# Patient Record
Sex: FEMALE | Race: WHITE | NOT HISPANIC OR LATINO | URBAN - METROPOLITAN AREA
[De-identification: names, ages, dates, MRNs, and addresses within clinical notes are randomized per-mention and may not be internally consistent; named-entity substitution may affect disease eponyms.]

---

## 2018-04-11 ENCOUNTER — OFFICE (OUTPATIENT)
Dept: URBAN - METROPOLITAN AREA CLINIC 78 | Facility: CLINIC | Age: 43
End: 2018-04-11

## 2018-04-11 VITALS
DIASTOLIC BLOOD PRESSURE: 92 MMHG | SYSTOLIC BLOOD PRESSURE: 118 MMHG | HEART RATE: 62 BPM | WEIGHT: 274 LBS | TEMPERATURE: 98.3 F | HEIGHT: 69 IN

## 2018-04-11 DIAGNOSIS — K76.0 FATTY (CHANGE OF) LIVER, NOT ELSEWHERE CLASSIFIED: ICD-10-CM

## 2018-04-11 PROCEDURE — 99244 OFF/OP CNSLTJ NEW/EST MOD 40: CPT

## 2018-04-11 NOTE — SERVICEHPINOTES
ROSALES CORTES   is a   42   year old    female who is being seen in consultation at the request of   AMAIRANI RIVAS   for fatty liver. Patient notes h/o being overweight and some h/o yo-yo dieting in past. She was having a lot of fatigue, brain fog and some RUQ discomfort last fall and during w/u she was noted to have fatty liver. Liver enzymes were normal. She has also seen neurology for some neuropathy issues. She has been reading about fatty liver and is making significant dietary/lifestyle changes along with liver supportive supplements. Has been able to lose weight and wants to continue gradual sustained weight loss with goal of getting her BMI &lt30. She is wanting to be as proactive as possible and is here for further advice. No GI complaints. Feeling well overall and prior RUQ discomfort and brain fog have resolved.  12/21/17 HgbA1C 5.0BR11/9/17 AST/ALT 18/18, ESR 34, B12 1093, Vit D 39, SPEP ok, TRINI neg, CRP 3.6BR11/4/17 plt 296, glucose 103, AST/ALT 18/22

## 2018-04-11 NOTE — INTERFACERESULTNOTES
These labs look good overall. Good cholesterol is a bit low. Otherwise normal lab. Insulin level is 13.4 - some would say that optimal is <8 but not a significant issue right now - likely improved based on your recent diet/lifestyle changes.

## 2018-04-18 LAB
CBC (INCLUDES DIFF/PLT): ABSOLUTE BAND NEUTROPHILS: NORMAL CELLS/UL
CBC (INCLUDES DIFF/PLT): ABSOLUTE BASOPHILS: 33 CELLS/UL (ref 0–200)
CBC (INCLUDES DIFF/PLT): ABSOLUTE BLASTS: NORMAL CELLS/UL
CBC (INCLUDES DIFF/PLT): ABSOLUTE EOSINOPHILS: 94 CELLS/UL (ref 15–500)
CBC (INCLUDES DIFF/PLT): ABSOLUTE LYMPHOCYTES: 2475 CELLS/UL (ref 850–3900)
CBC (INCLUDES DIFF/PLT): ABSOLUTE METAMYELOCYTES: NORMAL CELLS/UL
CBC (INCLUDES DIFF/PLT): ABSOLUTE MONOCYTES: 556 CELLS/UL (ref 200–950)
CBC (INCLUDES DIFF/PLT): ABSOLUTE MYELOCYTES: NORMAL CELLS/UL
CBC (INCLUDES DIFF/PLT): ABSOLUTE NEUTROPHILS: 2343 CELLS/UL (ref 1500–7800)
CBC (INCLUDES DIFF/PLT): ABSOLUTE NUCLEATED RBC: NORMAL CELLS/UL
CBC (INCLUDES DIFF/PLT): ABSOLUTE PROMYELOCYTES: NORMAL CELLS/UL
CBC (INCLUDES DIFF/PLT): BAND NEUTROPHILS: NORMAL %
CBC (INCLUDES DIFF/PLT): BASOPHILS: 0.6 %
CBC (INCLUDES DIFF/PLT): BLASTS: NORMAL %
CBC (INCLUDES DIFF/PLT): COMMENT(S): NORMAL
CBC (INCLUDES DIFF/PLT): EOSINOPHILS: 1.7 %
CBC (INCLUDES DIFF/PLT): HEMATOCRIT: 40.6 % (ref 35–45)
CBC (INCLUDES DIFF/PLT): HEMOGLOBIN: 13.8 G/DL (ref 11.7–15.5)
CBC (INCLUDES DIFF/PLT): LYMPHOCYTES: 45 %
CBC (INCLUDES DIFF/PLT): MCH: 31.2 PG (ref 27–33)
CBC (INCLUDES DIFF/PLT): MCHC: 34 G/DL (ref 32–36)
CBC (INCLUDES DIFF/PLT): MCV: 91.6 FL (ref 80–100)
CBC (INCLUDES DIFF/PLT): METAMYELOCYTES: NORMAL %
CBC (INCLUDES DIFF/PLT): MONOCYTES: 10.1 %
CBC (INCLUDES DIFF/PLT): MPV: 10.5 FL (ref 7.5–12.5)
CBC (INCLUDES DIFF/PLT): MYELOCYTES: NORMAL %
CBC (INCLUDES DIFF/PLT): NEUTROPHILS: 42.6 %
CBC (INCLUDES DIFF/PLT): NUCLEATED RBC: NORMAL /100 WBC
CBC (INCLUDES DIFF/PLT): PLATELET COUNT: 288 THOUSAND/UL (ref 140–400)
CBC (INCLUDES DIFF/PLT): PROMYELOCYTES: NORMAL %
CBC (INCLUDES DIFF/PLT): RDW: 14.2 % (ref 11–15)
CBC (INCLUDES DIFF/PLT): REACTIVE LYMPHOCYTES: NORMAL %
CBC (INCLUDES DIFF/PLT): RED BLOOD CELL COUNT: 4.43 MILLION/UL (ref 3.8–5.1)
CBC (INCLUDES DIFF/PLT): WHITE BLOOD CELL COUNT: 5.5 THOUSAND/UL (ref 3.8–10.8)
COMPREHENSIVE METABOLIC PANEL: ALBUMIN/GLOBULIN RATIO: 1.6 (CALC) (ref 1–2.5)
COMPREHENSIVE METABOLIC PANEL: ALBUMIN: 4 G/DL (ref 3.6–5.1)
COMPREHENSIVE METABOLIC PANEL: ALKALINE PHOSPHATASE: 79 U/L (ref 33–115)
COMPREHENSIVE METABOLIC PANEL: ALT: 25 U/L (ref 6–29)
COMPREHENSIVE METABOLIC PANEL: AST: 18 U/L (ref 10–30)
COMPREHENSIVE METABOLIC PANEL: BILIRUBIN, TOTAL: 0.4 MG/DL (ref 0.2–1.2)
COMPREHENSIVE METABOLIC PANEL: BUN/CREATININE RATIO: (no result) (CALC)
COMPREHENSIVE METABOLIC PANEL: CALCIUM: 9 MG/DL (ref 8.6–10.2)
COMPREHENSIVE METABOLIC PANEL: CARBON DIOXIDE: 24 MMOL/L (ref 20–31)
COMPREHENSIVE METABOLIC PANEL: CHLORIDE: 108 MMOL/L (ref 98–110)
COMPREHENSIVE METABOLIC PANEL: CREATININE: 0.69 MG/DL (ref 0.5–1.1)
COMPREHENSIVE METABOLIC PANEL: EGFR AFRICAN AMERICAN: 124 ML/MIN/1.73M2 (ref 60–?)
COMPREHENSIVE METABOLIC PANEL: EGFR NON-AFR. AMERICAN: 107 ML/MIN/1.73M2 (ref 60–?)
COMPREHENSIVE METABOLIC PANEL: GLOBULIN: 2.5 G/DL (CALC) (ref 1.9–3.7)
COMPREHENSIVE METABOLIC PANEL: GLUCOSE: 80 MG/DL (ref 65–99)
COMPREHENSIVE METABOLIC PANEL: POTASSIUM: 4.2 MMOL/L (ref 3.5–5.3)
COMPREHENSIVE METABOLIC PANEL: PROTEIN, TOTAL: 6.5 G/DL (ref 6.1–8.1)
COMPREHENSIVE METABOLIC PANEL: SODIUM: 138 MMOL/L (ref 135–146)
COMPREHENSIVE METABOLIC PANEL: UREA NITROGEN (BUN): 7 MG/DL (ref 7–25)
INSULIN: 13.4 UIU/ML (ref 2–19.6)
LIPID PANEL: CHOL/HDLC RATIO: 3.4 (CALC) (ref ?–5)
LIPID PANEL: CHOLESTEROL, TOTAL: 142 MG/DL (ref ?–200)
LIPID PANEL: HDL CHOLESTEROL: 42 MG/DL — LOW (ref 50–?)
LIPID PANEL: LDL-CHOLESTEROL: 81 MG/DL (CALC)
LIPID PANEL: NON HDL CHOLESTEROL: 100 MG/DL (CALC) (ref ?–130)
LIPID PANEL: TRIGLYCERIDES: 99 MG/DL (ref ?–150)

## 2019-04-11 ENCOUNTER — OFFICE (OUTPATIENT)
Dept: URBAN - METROPOLITAN AREA CLINIC 78 | Facility: CLINIC | Age: 44
End: 2019-04-11

## 2019-04-11 VITALS
HEART RATE: 77 BPM | TEMPERATURE: 97.4 F | HEIGHT: 69 IN | WEIGHT: 293 LBS | SYSTOLIC BLOOD PRESSURE: 132 MMHG | DIASTOLIC BLOOD PRESSURE: 96 MMHG

## 2019-04-11 DIAGNOSIS — K76.0 FATTY (CHANGE OF) LIVER, NOT ELSEWHERE CLASSIFIED: ICD-10-CM

## 2019-04-11 DIAGNOSIS — R10.11 RIGHT UPPER QUADRANT PAIN: ICD-10-CM

## 2019-04-11 PROCEDURE — 99214 OFFICE O/P EST MOD 30 MIN: CPT

## 2019-04-11 NOTE — SERVICEHPINOTES
42 yo female presents for f/u fatty liver. Last seen a year ago at which time she had been doing well with diet/lifestyle with result of weight loss and resolution of her prior RUQ pain and brain fog. Currently she has not been doing as well with her diet and has gained weight and has had return of RUQ pain and brain fog symptoms. States it is a dull pain that is there pretty much constantly. If she leans to the right she feels worse - sharper pain. Sometimes when she eats more acidic or higher fat foods or if she drinks coffee she can have worse symptoms however, often when she consumes these foods she feels ok, so she doesn't really feel there's a connection. She notes that high stress levels can also exacerbate the issue. She uses Naproxen about once a week for arthritis. Denies N/V. Has some baseline mild irregularity of bowel habits no blood in stools. Was on Prozac and trazodone in past but has been off these and is having more anxiety symptoms - planning to go back on meds and seeing a psychiatrist. Has also been working with a naturopath and taking supplements. Has known fatty liver but no evidence of fibrosis per Fibroscan last year and also has normal liver enzymes. She is anxious about her symptoms and reports having dreams of removing a metal pipe from her abdomen. 2/4/19 AST/ALT 18/25BR9/27/18 , TG 61, HDL 56, glucose 80, AST/ALT 18/27, HgbA1C 5.0, TSH 1.66, fT4 1.0, fT3 2.8, Vit B12 859, insulin 22.7, Vit D 32BR4/16/18 , TG 99, HDL 42, LDL 81, glucose 80, AST/ALT 18/25, plt 288, insulin 13.4 ELISSA score 2.6BR12/21/17 HgbA1C 5.0BR11/9/17 AST/ALT 18/18, ESR 34, B12 1093, Vit D 39, SPEP ok, TRINI neg, CRP 3.6BR11/4/17 plt 296, glucose 103, AST/ALT 18/22

## 2019-04-29 ENCOUNTER — AMBULATORY SURGICAL CENTER (OUTPATIENT)
Dept: URBAN - METROPOLITAN AREA SURGERY 21 | Facility: SURGERY | Age: 44
End: 2019-04-29

## 2019-04-29 DIAGNOSIS — R10.13 EPIGASTRIC PAIN: ICD-10-CM

## 2019-04-29 DIAGNOSIS — K29.70 GASTRITIS, UNSPECIFIED, WITHOUT BLEEDING: ICD-10-CM

## 2019-04-29 PROCEDURE — 43239 EGD BIOPSY SINGLE/MULTIPLE: CPT

## 2020-02-23 PROBLEM — H43.813 POSTERIOR VITREOUS DETACHMENT (PVD): Noted: 2020-02-23

## 2020-02-23 PROBLEM — H44.2E3 DEGENERATIVE MYOPIA WITH OTHER MACULOPATHY: Noted: 2020-02-23

## 2020-02-23 PROBLEM — H43.813 PARTIAL POSTERIOR VITREOUS DETACHMENT: Noted: 2020-02-23

## 2020-02-24 ENCOUNTER — PREPPED CHART (OUTPATIENT)
Dept: URBAN - METROPOLITAN AREA CLINIC 66 | Facility: CLINIC | Age: 45
End: 2020-02-24

## 2021-05-24 ENCOUNTER — OFFICE (OUTPATIENT)
Dept: URBAN - METROPOLITAN AREA CLINIC 79 | Facility: CLINIC | Age: 46
End: 2021-05-24

## 2021-05-24 VITALS
HEIGHT: 69 IN | HEART RATE: 61 BPM | WEIGHT: 218 LBS | TEMPERATURE: 97.7 F | SYSTOLIC BLOOD PRESSURE: 121 MMHG | DIASTOLIC BLOOD PRESSURE: 75 MMHG

## 2021-05-24 DIAGNOSIS — E66.9 OBESITY, UNSPECIFIED: ICD-10-CM

## 2021-05-24 DIAGNOSIS — K76.0 FATTY (CHANGE OF) LIVER, NOT ELSEWHERE CLASSIFIED: ICD-10-CM

## 2021-05-24 PROCEDURE — 99214 OFFICE O/P EST MOD 30 MIN: CPT | Performed by: PHYSICIAN ASSISTANT

## 2021-05-24 NOTE — SERVICEHPINOTES
Ms. Young is here for f/u. 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 She has since lost weight and drinks a lot of water. She has lost 95 lbs with diet and exercise. She wants a check up on her liver. No recent LFTs last LFTs were in 2019 and were normal at that time. Her last fibroscan in 2018 showed no fibrosis but moderate to severe fatty liver.  She doesn't smoke or drink. No longer using herbal supplementationShe declines a colonoscopy (we discussed that this can r/o and prevent CRC and now indicated given age of 45 for screening).

## 2021-05-25 LAB
HEPATIC FUNCTION PANEL: ALBUMIN/GLOBULIN RATIO: 1.8 (CALC) (ref 1–2.5)
HEPATIC FUNCTION PANEL: ALBUMIN: 4.2 G/DL (ref 3.6–5.1)
HEPATIC FUNCTION PANEL: ALKALINE PHOSPHATASE: 54 U/L (ref 31–125)
HEPATIC FUNCTION PANEL: ALT: 8 U/L (ref 6–29)
HEPATIC FUNCTION PANEL: AST: 9 U/L — LOW (ref 10–35)
HEPATIC FUNCTION PANEL: BILIRUBIN, DIRECT: 0.1 MG/DL (ref ?–0.2)
HEPATIC FUNCTION PANEL: BILIRUBIN, INDIRECT: 0.4 MG/DL (CALC) (ref 0.2–1.2)
HEPATIC FUNCTION PANEL: BILIRUBIN, TOTAL: 0.5 MG/DL (ref 0.2–1.2)
HEPATIC FUNCTION PANEL: GLOBULIN: 2.4 G/DL (CALC) (ref 1.9–3.7)
HEPATIC FUNCTION PANEL: PROTEIN, TOTAL: 6.6 G/DL (ref 6.1–8.1)

## 2021-06-09 ENCOUNTER — OFFICE (OUTPATIENT)
Dept: URBAN - METROPOLITAN AREA CLINIC 102 | Facility: CLINIC | Age: 46
End: 2021-06-09

## 2021-06-09 DIAGNOSIS — K76.0 FATTY (CHANGE OF) LIVER, NOT ELSEWHERE CLASSIFIED: ICD-10-CM

## 2021-06-09 PROCEDURE — 91200 LIVER ELASTOGRAPHY: CPT | Performed by: INTERNAL MEDICINE

## 2021-06-16 ENCOUNTER — OFFICE (OUTPATIENT)
Dept: URBAN - METROPOLITAN AREA TELEHEALTH 3 | Facility: TELEHEALTH | Age: 46
End: 2021-06-16

## 2021-06-16 VITALS — HEIGHT: 69 IN | WEIGHT: 222 LBS

## 2021-06-16 DIAGNOSIS — K76.0 FATTY (CHANGE OF) LIVER, NOT ELSEWHERE CLASSIFIED: ICD-10-CM

## 2021-06-16 DIAGNOSIS — E66.9 OBESITY, UNSPECIFIED: ICD-10-CM

## 2021-06-16 PROCEDURE — 99214 OFFICE O/P EST MOD 30 MIN: CPT | Mod: 95 | Performed by: PHYSICIAN ASSISTANT

## 2021-06-16 NOTE — SERVICEHPINOTES
PATIENT VERIFIED BY DATE OF BIRTH AND NAME. Patient has been consented for this telecommunication visit.   Ms. Young is here for f/u regarding fatty liver.    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 She has since lost weight and drinks a lot of water. She has lost 95 lbs with diet and exercise. Fibroscan in 2018 showed no fibrosis but moderate to severe fatty liver. Updated fibroscan shows S2 and F1 and LFTs are normal. She doesn't smoke or drink. No longer using herbal supplementationShe takes gabapentin which has stalled her weight loss. The plan is to get off of the gabapentin once she heals from neuropathy. She exercises daily. She declines a colonoscopy (we discussed that this can r/o and prevent CRC and now indicated given age of 45 for screening). ROS as per HPI and o/w is negative

## 2022-09-08 ENCOUNTER — OFFICE (OUTPATIENT)
Dept: URBAN - METROPOLITAN AREA CLINIC 102 | Facility: CLINIC | Age: 47
End: 2022-09-08

## 2022-09-08 DIAGNOSIS — K76.0 FATTY (CHANGE OF) LIVER, NOT ELSEWHERE CLASSIFIED: ICD-10-CM

## 2022-09-08 PROCEDURE — 91200 LIVER ELASTOGRAPHY: CPT | Performed by: INTERNAL MEDICINE

## 2023-01-10 ENCOUNTER — TELEHEALTH PROVIDED OTHER THAN IN PATIENT'S HOME (OUTPATIENT)
Dept: URBAN - METROPOLITAN AREA TELEHEALTH 12 | Facility: TELEHEALTH | Age: 48
End: 2023-01-10

## 2023-01-10 VITALS — WEIGHT: 260 LBS | HEIGHT: 69 IN

## 2023-01-10 DIAGNOSIS — R10.11 RIGHT UPPER QUADRANT PAIN: ICD-10-CM

## 2023-01-10 DIAGNOSIS — E66.9 OBESITY, UNSPECIFIED: ICD-10-CM

## 2023-01-10 DIAGNOSIS — R79.0 ABNORMAL LEVEL OF BLOOD MINERAL: ICD-10-CM

## 2023-01-10 PROCEDURE — 99214 OFFICE O/P EST MOD 30 MIN: CPT | Mod: 95 | Performed by: PHYSICIAN ASSISTANT

## 2023-01-10 NOTE — SERVICEHPINOTES
PATIENT VERIFIED BY DATE OF BIRTH AND NAME. Patient has been consented for this telecommunication visit.   We had previously discussed a colonoscopy and she had declined it in the past.  In the past 3 months, she notes pain in her right side--she tells me that she had an unremarkable CT scan (aside from ddd in her spine). But upon review, she's had RUQ pain intermittently for yrs. Pain is located in the RUQ. She felt worse after having her flu shot. She has given up chocolate, coffee. Pain can be brought on by eating gluten, dairy, etc. She would get pain earlier on with any po intake. She has been taking IBgard. No vomiting and very mild nausea at times. Pain can wake her up from sleep. U/S have shown normal gallbladder as has a CT scan. EGD for RUQ pain showed mild gastritis. She tells me that she has a chronically low ferritin and is on iron. When on iron, it seems to exacerbate this R sided pain. She had a negative cologuard recently. She drinks a lot of non-dairy keiffer and she thinks that this has helped her symptoms too. No family hx of gallbladder disease. No blood in her stool or per rectum. She had a + fecal occult test in August 2022. No family hx of gallbladder disease. Both of her grandfathers had CRC. No known heart issues--has had a cardiac eval in the past--normal echo and stress test. 
br
br
ROS as per HPI and o/w is unremarkable.

## 2023-02-14 ENCOUNTER — OFFICE (OUTPATIENT)
Dept: URBAN - METROPOLITAN AREA CLINIC 79 | Facility: CLINIC | Age: 48
End: 2023-02-14

## 2023-02-14 PROCEDURE — 00031: CPT | Performed by: INTERNAL MEDICINE

## 2023-05-19 ENCOUNTER — OFFICE (OUTPATIENT)
Dept: URBAN - METROPOLITAN AREA CLINIC 30 | Facility: CLINIC | Age: 48
End: 2023-05-19
Payer: COMMERCIAL

## 2023-05-19 ENCOUNTER — OFFICE (OUTPATIENT)
Dept: URBAN - METROPOLITAN AREA PATHOLOGY 18 | Facility: PATHOLOGY | Age: 48
End: 2023-05-19
Payer: COMMERCIAL

## 2023-05-19 VITALS
OXYGEN SATURATION: 95 % | DIASTOLIC BLOOD PRESSURE: 79 MMHG | SYSTOLIC BLOOD PRESSURE: 117 MMHG | DIASTOLIC BLOOD PRESSURE: 111 MMHG | OXYGEN SATURATION: 94 % | OXYGEN SATURATION: 96 % | DIASTOLIC BLOOD PRESSURE: 105 MMHG | SYSTOLIC BLOOD PRESSURE: 127 MMHG | RESPIRATION RATE: 20 BRPM | OXYGEN SATURATION: 99 % | DIASTOLIC BLOOD PRESSURE: 92 MMHG | SYSTOLIC BLOOD PRESSURE: 144 MMHG | TEMPERATURE: 98 F | SYSTOLIC BLOOD PRESSURE: 161 MMHG | WEIGHT: 270 LBS | HEART RATE: 71 BPM | RESPIRATION RATE: 24 BRPM | SYSTOLIC BLOOD PRESSURE: 132 MMHG | RESPIRATION RATE: 16 BRPM | HEIGHT: 69 IN | RESPIRATION RATE: 18 BRPM | TEMPERATURE: 97.7 F | HEART RATE: 64 BPM | HEART RATE: 70 BPM | RESPIRATION RATE: 22 BRPM | RESPIRATION RATE: 12 BRPM | DIASTOLIC BLOOD PRESSURE: 80 MMHG | HEART RATE: 67 BPM

## 2023-05-19 DIAGNOSIS — K62.1 RECTAL POLYP: ICD-10-CM

## 2023-05-19 DIAGNOSIS — R10.11 RIGHT UPPER QUADRANT PAIN: ICD-10-CM

## 2023-05-19 PROBLEM — K63.5 POLYP OF COLON: Status: ACTIVE | Noted: 2023-05-19

## 2023-05-19 PROCEDURE — 88305 TISSUE EXAM BY PATHOLOGIST: CPT | Performed by: PATHOLOGY

## 2023-06-28 ENCOUNTER — TELEHEALTH PROVIDED IN PATIENT'S HOME (OUTPATIENT)
Dept: URBAN - METROPOLITAN AREA CLINIC 102 | Facility: CLINIC | Age: 48
End: 2023-06-28
Payer: COMMERCIAL

## 2023-06-28 VITALS — HEIGHT: 69 IN | WEIGHT: 275 LBS

## 2023-06-28 DIAGNOSIS — R10.11 RIGHT UPPER QUADRANT PAIN: ICD-10-CM

## 2023-06-28 DIAGNOSIS — E66.9 OBESITY, UNSPECIFIED: ICD-10-CM

## 2023-06-28 PROCEDURE — 99214 OFFICE O/P EST MOD 30 MIN: CPT | Mod: 95 | Performed by: PHYSICIAN ASSISTANT

## 2023-06-28 NOTE — SERVICEHPINOTES
PATIENT VERIFIED BY DATE OF BIRTH AND NAME. Patient has been consented for this telecommunication visit.   Pt is here for f/u regarding RUQ pain--symptoms began in 2017. She doesn't think that her symptoms are d/t her spine as pain isn't affected by position. She is making her own non-dairy henrry and takes olive oil pills which help significantly. She has to stand up most of the day at work b/c if she sits, she gets brain fog and feels like something is "crushing" on her right side. Coffee and coconut makes it feel worse. She's had this pain since 2017. She had an EGD 04/2019 which showed a 1 cm hiatal hernia and mild gastritis-- no h pylori or celiac disease found. Normal HIDA scan, U/S, and CT scan. She has been doing stretches to pull the right side which helps. On a waiting list to see a functional medicine doctor. She's done food sensitivity testing two separate times--hasn't found this overly helpful. She doesn't think that her symptoms are c/w SIBO--doesn't have any bloating however as henrry helps so much, she is willing to be checked for this. She's tried IBgard before which she finds to be helpful. She would like her small intestine evaluated as she wonders if this is the source for her symptoms. We discussed possibly updating an EGD but her pain is exactly the same and in the same spot so likely no yield--it hasn't changed since it began in 2017.  Cannot lose weight, no other symptoms other than brain fog--she denies anorexia, early satiety, nausea, vomiting. etc. Of note, when she was able to lose wt in the past, did see improvement in her symptoms. ROS as per HPI and otherwise unremarkable. No other GI related complaints today.

## 2023-10-04 ENCOUNTER — OFFICE (OUTPATIENT)
Dept: URBAN - METROPOLITAN AREA CLINIC 102 | Facility: CLINIC | Age: 48
End: 2023-10-04
Payer: COMMERCIAL

## 2023-10-04 DIAGNOSIS — K76.0 FATTY (CHANGE OF) LIVER, NOT ELSEWHERE CLASSIFIED: ICD-10-CM

## 2023-10-04 PROCEDURE — 76981 USE PARENCHYMA: CPT | Performed by: PHYSICIAN ASSISTANT

## 2024-05-06 ASSESSMENT — TONOMETRY
OD_IOP_MMHG: 16
OS_IOP_MMHG: 16

## 2024-05-06 ASSESSMENT — VISUAL ACUITY
OS_CC: 20/20
OD_CC: 20/20

## 2024-05-09 ENCOUNTER — NEW PATIENT (OUTPATIENT)
Dept: URBAN - METROPOLITAN AREA CLINIC 66 | Facility: CLINIC | Age: 49
End: 2024-05-09

## 2024-05-09 DIAGNOSIS — H43.823: ICD-10-CM

## 2024-05-09 DIAGNOSIS — H43.813: ICD-10-CM

## 2024-05-09 DIAGNOSIS — H53.10: ICD-10-CM

## 2024-05-09 DIAGNOSIS — H44.2E3: ICD-10-CM

## 2024-05-09 PROCEDURE — 92134 CPTRZ OPH DX IMG PST SGM RTA: CPT

## 2024-05-09 PROCEDURE — 92004 COMPRE OPH EXAM NEW PT 1/>: CPT

## 2024-05-09 PROCEDURE — 92202 OPSCPY EXTND ON/MAC DRAW: CPT

## 2024-05-09 ASSESSMENT — TONOMETRY
OD_IOP_MMHG: 19
OS_IOP_MMHG: 19

## 2024-05-09 ASSESSMENT — VISUAL ACUITY
OS_CC: 20/20
OD_CC: 20/20

## 2025-02-27 ENCOUNTER — OFFICE (OUTPATIENT)
Dept: URBAN - METROPOLITAN AREA CLINIC 79 | Facility: CLINIC | Age: 50
End: 2025-02-27

## 2025-02-27 VITALS
TEMPERATURE: 97.9 F | HEIGHT: 69 IN | WEIGHT: 218 LBS | DIASTOLIC BLOOD PRESSURE: 82 MMHG | SYSTOLIC BLOOD PRESSURE: 145 MMHG | HEART RATE: 83 BPM

## 2025-02-27 DIAGNOSIS — E53.8 DEFICIENCY OF OTHER SPECIFIED B GROUP VITAMINS: ICD-10-CM

## 2025-02-27 DIAGNOSIS — K90.49 MALABSORPTION DUE TO INTOLERANCE, NOT ELSEWHERE CLASSIFIED: ICD-10-CM

## 2025-02-27 DIAGNOSIS — R10.9 UNSPECIFIED ABDOMINAL PAIN: ICD-10-CM

## 2025-02-27 PROCEDURE — 99214 OFFICE O/P EST MOD 30 MIN: CPT | Performed by: PHYSICIAN ASSISTANT

## 2025-06-27 ENCOUNTER — TELEHEALTH PROVIDED IN PATIENT'S HOME (OUTPATIENT)
Dept: URBAN - METROPOLITAN AREA TELEHEALTH 3 | Facility: TELEHEALTH | Age: 50
End: 2025-06-27
Payer: COMMERCIAL

## 2025-06-27 VITALS — WEIGHT: 210 LBS | HEIGHT: 69 IN

## 2025-06-27 DIAGNOSIS — K76.0 FATTY (CHANGE OF) LIVER, NOT ELSEWHERE CLASSIFIED: ICD-10-CM

## 2025-06-27 PROCEDURE — 99214 OFFICE O/P EST MOD 30 MIN: CPT | Mod: 95 | Performed by: PHYSICIAN ASSISTANT

## 2025-06-27 NOTE — SERVICEHPINOTES
PATIENT VERIFIED BY DATE OF BIRTH AND NAME. Patient has been consented for this telecommunication visit using [employer] application.   Pt is here for f/u. She continues to have side pain and she is working with Dr. Limon for this. She is here today to discuss updating a fibroscan. She has lost 65 lbs since the last one was performed 10/2023. At that time, the fibroscan showed S3 and F0. She    has normal LFTS and has not had high LFTS in the past. She is eating healthy to keep the weight off. She is eating smaller portions--eating &lt 1500 calories per day. She walks 5 days per week. No ETOH use. No family hx of liver disease (other than those who were alcoholics). ros as per hpi and o/w is unremarkable. No other GI related complaints today.

## 2025-06-27 NOTE — SERVICENOTES
Patient was located in their home during visit., I spent 15 minutes today reviewing the chart, talking with the patient, reviewing previous results with patient, discussing plan, and documenting. Patient understands and agrees with plan. Questions/concerns addressed., Patient's visit was conducted through [employer] video telecommunication. Patient consented before the start of visit as to understanding of privacy concerns, possible technological failure, and their responsibility of carrying out instructions of plan.,

## 2025-07-17 ENCOUNTER — OFFICE (OUTPATIENT)
Dept: URBAN - METROPOLITAN AREA CLINIC 102 | Facility: CLINIC | Age: 50
End: 2025-07-17
Payer: COMMERCIAL

## 2025-07-17 DIAGNOSIS — K76.0 FATTY (CHANGE OF) LIVER, NOT ELSEWHERE CLASSIFIED: ICD-10-CM

## 2025-07-17 PROCEDURE — 76981 USE PARENCHYMA: CPT | Performed by: PHYSICIAN ASSISTANT
